# Patient Record
Sex: FEMALE | Race: WHITE | Employment: FULL TIME | URBAN - METROPOLITAN AREA
[De-identification: names, ages, dates, MRNs, and addresses within clinical notes are randomized per-mention and may not be internally consistent; named-entity substitution may affect disease eponyms.]

---

## 2020-01-21 ENCOUNTER — HOSPITAL ENCOUNTER (OUTPATIENT)
Dept: SURGERY | Age: 45
Discharge: HOME | End: 2020-01-21

## 2020-01-21 VITALS — HEIGHT: 64 IN | WEIGHT: 130 LBS | BODY MASS INDEX: 22.2 KG/M2

## 2020-01-21 RX ORDER — ACETAMINOPHEN 325 MG/1
TABLET ORAL
COMMUNITY
End: 2020-01-27

## 2020-01-21 NOTE — PERIOP NOTES
Patient verified name and . Order for consent NOT found in EHR, unable to confirm case posting; patient verifies procedure. Type 2 surgery, PAT phone assessment complete. Orders NOT received. Labs per surgeon: No orders received at this time. Labs per anesthesia protocol: Hgb DOS; order signed and held in EHR. Patient lives out of town and is not able to come have lab work drawn prior to surgery. Patient answered medical/surgical history questions at their best of ability. All prior to admission medications documented in [facility]. Patient instructed to take the following medications the day of surgery according to anesthesia guidelines with a small sip of water: Tylenol PRN. Hold all vitamins, supplements, herbals 7 days prior to surgery and NSAIDS 5 days prior to surgery. Patient instructed on the following:  Arrive at [address], time of arrival to be called the day before by 1700  NPO after midnight including gum, mints, and ice chips  Responsible adult must drive patient to the hospital, stay during surgery, and patient will need supervision 24 hours after anesthesia  Use anti-bacterial soap in shower the night before surgery and on the morning of surgery  All piercings must be removed prior to arrival.    Leave all valuables (money and jewelry) at home but bring insurance card and ID on       DOS. Do not wear make-up, nail polish, lotions, cologne, perfumes, powders, or oil on skin. Patient teach back successful and patient demonstrates knowledge of instruction.

## 2020-01-23 ENCOUNTER — ANESTHESIA EVENT (OUTPATIENT)
Dept: SURGERY | Age: 45
End: 2020-01-23
Payer: COMMERCIAL

## 2020-01-26 NOTE — H&P
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 This is causing pelvic pain issues, menorrhagia, bladder issues. 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 Eye: Agudelo Needs denies ocular pain or other visual symptoms. Cardiovascular: Agudelo Needs denies palpitations, chest pain or other cardiovascular problem. Respiratory: Reginailazack denies cough, breathing difficulty or other respiratory condition. Gastrointestinal: Reginailazack denies nausea, vomiting, diarrhea, constipation or other gastrointestinal symptom. Genitourinary: Christine denies dysuria, incontinence, hematuria or other genitourinary symptom. Integumentary: Magnus Needs denies galactorrhea or other breast problem. Neurologic: Agudelo Needs denies sensory defects, problems with coordination or concentration, or other neurologic problem. Psychiatric: Magnus Needs denies anxiey, depression or any psychiatric problems. VITAL SIGNS   Height : 5 ft 4 in. Weight: 123 BMI: 21.146   BP: 116 / 71 Resp. : 16 Pulse: 85     PHYSICAL EXAM   General Appearance: Well developed, well nourished patient in no acute distress. Skin: Normal. No rash, lesions or hirsutism. Head: Normocephalic / atraumatic. EENT: Normal eyes, ears, nose and throat. Neck: No masses, lymphadenopathy or thyromegaly. Chest: Normal breath sounds bilaterally with no rales, rhonchi or wheezes. Cardiac: Regular rate and rhythm without murmurs, rubs or gallops. Abdomen: Soft and non-tender to palpation. No organomegaly or massess. Extremities: No cyanosis, clubbing or edema. Neurologic: Grossly intact. Female Pelvic: Normal.     DISCUSSION / INFORMED CONSENT  The patient alone was present for this discussion.    We discussed the details of the planned surgery including the risks, benefits and treatment alternatives. We discussed the incision sites and the anticipated recovery period as well as the risks, benefits, and alternatives to the procedure. The risks include damage to bowel, bladder, or other organs, risk of bleeding, infection, adhesion formation, risk of the procedure not being able to be fully completed and need for future surgery. There are also small risks of requiring a blood transfusion, risks associated with anesthesia, development of deep venous thrombosis, heart attack, and even death. We discussed the possibility that this surgery may not completely eliminate her problem and that future procedures may be needed. Specific to a laparoscopy, there are additional risks of damage to internal organs due to the placement of intrabdominal trocars and instruments. In addition, there may be a need to convert the laparoscopy to an open procedure using a larger abdominal incision due to complications or inability to complete the procedure laparoscopically. Specific to a hysteroscopy are risks of uterine perforation, uterine bleeding, infection, and the risks of fluid overload due to absorption of hysteroscopic fluids. In some cases laparoscopy will need to be performed, and in rare cases, mainly due bleeding from perforation, a laparotomy, or incision on the abdomen will be necessary. We discusssed the laparoscopic myomectomy procedure including the anticipated abdominal incisions and small chance of needing to convert to an open procedure. In most cases, the majority of fibroids can be removed, but there is always a chance that all fibroids cannot be located or some may not be able to be removed. Discussed the possibility or need to open the uterine cavity and risk of intrauterine scar tissue formation with this. Discussed the recurrence rate of fibroids and that they may need to be addressed again in the futire.  Discussed the risk of bleeding and the possible development of coagulation disorders and/or the need for a blood transfusion during or after the procedure. Also discussed the risk of intraabdominal adhesion formation which could affect the intestines, fallopian tubes and/or subsequent fertility. We discussed the anticipated hospital course and recovery period. I discussed specifically risks associated with fibroid morcellation. Discussed the concerns that a fibroid may harbour a malignancy, but this chance is very low, but does increase with age. In the low chance that a malignancy was found, this could cause it to be in a higher stage possibly affecting treatments and outcomes. An alternative to morcellation would be an open myomectomy or possible removal vaginally, but this is dependent on the clinical situation. However, there is no evidence that an open myomectomy or non-morcellation myomectomy would be associated with better outcomes in the low chance of a malignancy. There are clearly increased risks in morbidity with an open myomectomy vs a laparoscopic myomectomy. The patient voiced understanding of all of the risks and wished to proceed with the procedure. Informed consents were signed.      PLAN:   Robotic-assisted laparoscopic myomectomy, hysteroscopy

## 2020-01-27 ENCOUNTER — ANESTHESIA (OUTPATIENT)
Dept: SURGERY | Age: 45
End: 2020-01-27
Payer: COMMERCIAL

## 2020-01-27 ENCOUNTER — APPOINTMENT (OUTPATIENT)
Dept: GENERAL RADIOLOGY | Age: 45
End: 2020-01-27
Attending: ANESTHESIOLOGY
Payer: COMMERCIAL

## 2020-01-27 ENCOUNTER — HOSPITAL ENCOUNTER (OUTPATIENT)
Age: 45
Discharge: HOME | End: 2020-01-27
Attending: OBSTETRICS & GYNECOLOGY | Admitting: OBSTETRICS & GYNECOLOGY
Payer: COMMERCIAL

## 2020-01-27 VITALS
DIASTOLIC BLOOD PRESSURE: 52 MMHG | TEMPERATURE: 100 F | OXYGEN SATURATION: 92 % | WEIGHT: 131 LBS | RESPIRATION RATE: 18 BRPM | HEIGHT: 64 IN | HEART RATE: 63 BPM | BODY MASS INDEX: 22.36 KG/M2 | SYSTOLIC BLOOD PRESSURE: 96 MMHG

## 2020-01-27 DIAGNOSIS — R10.2 PELVIC PAIN: Primary | ICD-10-CM

## 2020-01-27 PROBLEM — D21.9 FIBROIDS: Status: ACTIVE | Noted: 2020-01-27

## 2020-01-27 LAB
ABO + RH BLD: NORMAL
ATRIAL RATE: 67 BPM
BLOOD GROUP ANTIBODIES SERPL: NORMAL
CALCULATED P AXIS, ECG09: 46 DEGREES
CALCULATED R AXIS, ECG10: 26 DEGREES
CALCULATED T AXIS, ECG11: 14 DEGREES
DIAGNOSIS, 93000: NORMAL
ERYTHROCYTE [DISTWIDTH] IN BLOOD BY AUTOMATED COUNT: 12.4 % (ref 11.9–14.6)
HCG UR QL: NEGATIVE
HCT VFR BLD AUTO: 38.3 % (ref 35.8–46.3)
HGB BLD-MCNC: 12.7 G/DL (ref 11.7–15.4)
MCH RBC QN AUTO: 31.6 PG (ref 26.1–32.9)
MCHC RBC AUTO-ENTMCNC: 33.2 G/DL (ref 31.4–35)
MCV RBC AUTO: 95.3 FL (ref 79.6–97.8)
NRBC # BLD: 0 K/UL (ref 0–0.2)
P-R INTERVAL, ECG05: 130 MS
PLATELET # BLD AUTO: 233 K/UL (ref 150–450)
PMV BLD AUTO: 9.8 FL (ref 9.4–12.3)
Q-T INTERVAL, ECG07: 434 MS
QRS DURATION, ECG06: 84 MS
QTC CALCULATION (BEZET), ECG08: 458 MS
RBC # BLD AUTO: 4.02 M/UL (ref 4.05–5.2)
SPECIMEN EXP DATE BLD: NORMAL
TROPONIN I SERPL-MCNC: <0.02 NG/ML (ref 0.02–0.05)
VENTRICULAR RATE, ECG03: 67 BPM
WBC # BLD AUTO: 4.3 K/UL (ref 4.3–11.1)

## 2020-01-27 PROCEDURE — 77030003578 HC NDL INSUF VERES AMR -B: Performed by: OBSTETRICS & GYNECOLOGY

## 2020-01-27 PROCEDURE — 81025 URINE PREGNANCY TEST: CPT

## 2020-01-27 PROCEDURE — 93005 ELECTROCARDIOGRAM TRACING: CPT | Performed by: ANESTHESIOLOGY

## 2020-01-27 PROCEDURE — 74011250636 HC RX REV CODE- 250/636: Performed by: ANESTHESIOLOGY

## 2020-01-27 PROCEDURE — 86900 BLOOD TYPING SEROLOGIC ABO: CPT

## 2020-01-27 PROCEDURE — 74011250636 HC RX REV CODE- 250/636: Performed by: OBSTETRICS & GYNECOLOGY

## 2020-01-27 PROCEDURE — 77030040922 HC BLNKT HYPOTHRM STRY -A: Performed by: NURSE ANESTHETIST, CERTIFIED REGISTERED

## 2020-01-27 PROCEDURE — 77030018846 HC SOL IRR STRL H20 ICUM -A: Performed by: OBSTETRICS & GYNECOLOGY

## 2020-01-27 PROCEDURE — 76210000021 HC REC RM PH II 0.5 TO 1 HR: Performed by: OBSTETRICS & GYNECOLOGY

## 2020-01-27 PROCEDURE — 74011250637 HC RX REV CODE- 250/637: Performed by: OBSTETRICS & GYNECOLOGY

## 2020-01-27 PROCEDURE — 85027 COMPLETE CBC AUTOMATED: CPT

## 2020-01-27 PROCEDURE — 74011250636 HC RX REV CODE- 250/636: Performed by: NURSE ANESTHETIST, CERTIFIED REGISTERED

## 2020-01-27 PROCEDURE — 77030016151 HC PROTCTR LNS DFOG COVD -B: Performed by: OBSTETRICS & GYNECOLOGY

## 2020-01-27 PROCEDURE — 77030039425 HC BLD LARYNG TRULITE DISP TELE -A: Performed by: NURSE ANESTHETIST, CERTIFIED REGISTERED

## 2020-01-27 PROCEDURE — 77030008756 HC TU IRR SUC STRY -B: Performed by: OBSTETRICS & GYNECOLOGY

## 2020-01-27 PROCEDURE — 77030010545: Performed by: OBSTETRICS & GYNECOLOGY

## 2020-01-27 PROCEDURE — 77030002933 HC SUT MCRYL J&J -A: Performed by: OBSTETRICS & GYNECOLOGY

## 2020-01-27 PROCEDURE — 77030031492 HC PRT ACC BLNT AIRSEAL CNMD -B: Performed by: OBSTETRICS & GYNECOLOGY

## 2020-01-27 PROCEDURE — 77030022704 HC SUT VLOC COVD -B: Performed by: OBSTETRICS & GYNECOLOGY

## 2020-01-27 PROCEDURE — 76060000035 HC ANESTHESIA 2 TO 2.5 HR: Performed by: OBSTETRICS & GYNECOLOGY

## 2020-01-27 PROCEDURE — 77030037088 HC TUBE ENDOTRACH ORAL NSL COVD-A: Performed by: NURSE ANESTHETIST, CERTIFIED REGISTERED

## 2020-01-27 PROCEDURE — 77030011502 HC MANIP UTER ZUM ZINN -B: Performed by: OBSTETRICS & GYNECOLOGY

## 2020-01-27 PROCEDURE — C1765 ADHESION BARRIER: HCPCS | Performed by: OBSTETRICS & GYNECOLOGY

## 2020-01-27 PROCEDURE — 77030020703 HC SEAL CANN DISP INTU -B: Performed by: OBSTETRICS & GYNECOLOGY

## 2020-01-27 PROCEDURE — 77030019927 HC TBNG IRR CYSTO BAXT -A: Performed by: OBSTETRICS & GYNECOLOGY

## 2020-01-27 PROCEDURE — 74011000250 HC RX REV CODE- 250: Performed by: NURSE ANESTHETIST, CERTIFIED REGISTERED

## 2020-01-27 PROCEDURE — 77030010507 HC ADH SKN DERMBND J&J -B: Performed by: OBSTETRICS & GYNECOLOGY

## 2020-01-27 PROCEDURE — C1782 MORCELLATOR: HCPCS | Performed by: OBSTETRICS & GYNECOLOGY

## 2020-01-27 PROCEDURE — 74011250637 HC RX REV CODE- 250/637: Performed by: ANESTHESIOLOGY

## 2020-01-27 PROCEDURE — 77030018836 HC SOL IRR NACL ICUM -A: Performed by: OBSTETRICS & GYNECOLOGY

## 2020-01-27 PROCEDURE — 76210000017 HC OR PH I REC 1.5 TO 2 HR: Performed by: OBSTETRICS & GYNECOLOGY

## 2020-01-27 PROCEDURE — 77030035277 HC OBTRTR BLDELSS DISP INTU -B: Performed by: OBSTETRICS & GYNECOLOGY

## 2020-01-27 PROCEDURE — 88305 TISSUE EXAM BY PATHOLOGIST: CPT

## 2020-01-27 PROCEDURE — 74011000250 HC RX REV CODE- 250: Performed by: OBSTETRICS & GYNECOLOGY

## 2020-01-27 PROCEDURE — 76010000875 HC OR TIME 1.5 TO 2HR INTENSV - TIER 2: Performed by: OBSTETRICS & GYNECOLOGY

## 2020-01-27 PROCEDURE — 77030040361 HC SLV COMPR DVT MDII -B: Performed by: OBSTETRICS & GYNECOLOGY

## 2020-01-27 PROCEDURE — 77030034696 HC CATH URETH FOL 2W BARD -A: Performed by: OBSTETRICS & GYNECOLOGY

## 2020-01-27 PROCEDURE — 71045 X-RAY EXAM CHEST 1 VIEW: CPT

## 2020-01-27 PROCEDURE — 77030031139 HC SUT VCRL2 J&J -A: Performed by: OBSTETRICS & GYNECOLOGY

## 2020-01-27 PROCEDURE — 84484 ASSAY OF TROPONIN QUANT: CPT

## 2020-01-27 RX ORDER — NEOSTIGMINE METHYLSULFATE 1 MG/ML
INJECTION, SOLUTION INTRAVENOUS AS NEEDED
Status: DISCONTINUED | OUTPATIENT
Start: 2020-01-27 | End: 2020-01-27 | Stop reason: HOSPADM

## 2020-01-27 RX ORDER — GLYCOPYRROLATE 0.2 MG/ML
INJECTION INTRAMUSCULAR; INTRAVENOUS AS NEEDED
Status: DISCONTINUED | OUTPATIENT
Start: 2020-01-27 | End: 2020-01-27 | Stop reason: HOSPADM

## 2020-01-27 RX ORDER — SODIUM CHLORIDE, SODIUM LACTATE, POTASSIUM CHLORIDE, CALCIUM CHLORIDE 600; 310; 30; 20 MG/100ML; MG/100ML; MG/100ML; MG/100ML
100 INJECTION, SOLUTION INTRAVENOUS CONTINUOUS
Status: DISCONTINUED | OUTPATIENT
Start: 2020-01-27 | End: 2020-01-27 | Stop reason: HOSPADM

## 2020-01-27 RX ORDER — SODIUM CHLORIDE, SODIUM LACTATE, POTASSIUM CHLORIDE, CALCIUM CHLORIDE 600; 310; 30; 20 MG/100ML; MG/100ML; MG/100ML; MG/100ML
75 INJECTION, SOLUTION INTRAVENOUS CONTINUOUS
Status: DISCONTINUED | OUTPATIENT
Start: 2020-01-27 | End: 2020-01-27 | Stop reason: HOSPADM

## 2020-01-27 RX ORDER — TRANEXAMIC ACID 100 MG/ML
INJECTION, SOLUTION INTRAVENOUS AS NEEDED
Status: DISCONTINUED | OUTPATIENT
Start: 2020-01-27 | End: 2020-01-27 | Stop reason: HOSPADM

## 2020-01-27 RX ORDER — LIDOCAINE HYDROCHLORIDE 10 MG/ML
0.1 INJECTION INFILTRATION; PERINEURAL AS NEEDED
Status: DISCONTINUED | OUTPATIENT
Start: 2020-01-27 | End: 2020-01-27 | Stop reason: HOSPADM

## 2020-01-27 RX ORDER — VASOPRESSIN 20 U/ML
INJECTION PARENTERAL AS NEEDED
Status: DISCONTINUED | OUTPATIENT
Start: 2020-01-27 | End: 2020-01-27 | Stop reason: HOSPADM

## 2020-01-27 RX ORDER — KETOROLAC TROMETHAMINE 30 MG/ML
INJECTION, SOLUTION INTRAMUSCULAR; INTRAVENOUS AS NEEDED
Status: DISCONTINUED | OUTPATIENT
Start: 2020-01-27 | End: 2020-01-27 | Stop reason: HOSPADM

## 2020-01-27 RX ORDER — SODIUM CHLORIDE 0.9 % (FLUSH) 0.9 %
5-40 SYRINGE (ML) INJECTION AS NEEDED
Status: DISCONTINUED | OUTPATIENT
Start: 2020-01-27 | End: 2020-01-27 | Stop reason: HOSPADM

## 2020-01-27 RX ORDER — BUPIVACAINE HYDROCHLORIDE 2.5 MG/ML
INJECTION, SOLUTION EPIDURAL; INFILTRATION; INTRACAUDAL AS NEEDED
Status: DISCONTINUED | OUTPATIENT
Start: 2020-01-27 | End: 2020-01-27 | Stop reason: HOSPADM

## 2020-01-27 RX ORDER — MIDAZOLAM HYDROCHLORIDE 1 MG/ML
2 INJECTION, SOLUTION INTRAMUSCULAR; INTRAVENOUS
Status: DISCONTINUED | OUTPATIENT
Start: 2020-01-27 | End: 2020-01-27 | Stop reason: HOSPADM

## 2020-01-27 RX ORDER — EPINEPHRINE 1 MG/ML
INJECTION INTRAMUSCULAR; INTRAVENOUS; SUBCUTANEOUS AS NEEDED
Status: DISCONTINUED | OUTPATIENT
Start: 2020-01-27 | End: 2020-01-27 | Stop reason: HOSPADM

## 2020-01-27 RX ORDER — NALOXONE HYDROCHLORIDE 0.4 MG/ML
0.1 INJECTION, SOLUTION INTRAMUSCULAR; INTRAVENOUS; SUBCUTANEOUS
Status: DISCONTINUED | OUTPATIENT
Start: 2020-01-27 | End: 2020-01-27 | Stop reason: HOSPADM

## 2020-01-27 RX ORDER — FLUMAZENIL 0.1 MG/ML
0.2 INJECTION INTRAVENOUS
Status: DISCONTINUED | OUTPATIENT
Start: 2020-01-27 | End: 2020-01-27 | Stop reason: HOSPADM

## 2020-01-27 RX ORDER — ROCURONIUM BROMIDE 10 MG/ML
INJECTION, SOLUTION INTRAVENOUS AS NEEDED
Status: DISCONTINUED | OUTPATIENT
Start: 2020-01-27 | End: 2020-01-27 | Stop reason: HOSPADM

## 2020-01-27 RX ORDER — METHYLENE BLUE 10 MG/ML
INJECTION INTRAVENOUS AS NEEDED
Status: DISCONTINUED | OUTPATIENT
Start: 2020-01-27 | End: 2020-01-27 | Stop reason: HOSPADM

## 2020-01-27 RX ORDER — CEFAZOLIN SODIUM/WATER 2 G/20 ML
2 SYRINGE (ML) INTRAVENOUS ONCE
Status: COMPLETED | OUTPATIENT
Start: 2020-01-27 | End: 2020-01-27

## 2020-01-27 RX ORDER — HYDROMORPHONE HYDROCHLORIDE 2 MG/ML
INJECTION, SOLUTION INTRAMUSCULAR; INTRAVENOUS; SUBCUTANEOUS AS NEEDED
Status: DISCONTINUED | OUTPATIENT
Start: 2020-01-27 | End: 2020-01-27 | Stop reason: HOSPADM

## 2020-01-27 RX ORDER — FENTANYL CITRATE 50 UG/ML
INJECTION, SOLUTION INTRAMUSCULAR; INTRAVENOUS AS NEEDED
Status: DISCONTINUED | OUTPATIENT
Start: 2020-01-27 | End: 2020-01-27 | Stop reason: HOSPADM

## 2020-01-27 RX ORDER — OXYCODONE HYDROCHLORIDE 5 MG/1
5 TABLET ORAL
Status: DISCONTINUED | OUTPATIENT
Start: 2020-01-27 | End: 2020-01-27 | Stop reason: HOSPADM

## 2020-01-27 RX ORDER — OXYCODONE AND ACETAMINOPHEN 5; 325 MG/1; MG/1
1 TABLET ORAL
Qty: 30 TAB | Refills: 0 | Status: SHIPPED | OUTPATIENT
Start: 2020-01-27 | End: 2020-01-30

## 2020-01-27 RX ORDER — MISOPROSTOL 200 UG/1
400 TABLET ORAL ONCE
Status: COMPLETED | OUTPATIENT
Start: 2020-01-27 | End: 2020-01-27

## 2020-01-27 RX ORDER — DIPHENHYDRAMINE HYDROCHLORIDE 50 MG/ML
12.5 INJECTION, SOLUTION INTRAMUSCULAR; INTRAVENOUS
Status: DISCONTINUED | OUTPATIENT
Start: 2020-01-27 | End: 2020-01-27 | Stop reason: HOSPADM

## 2020-01-27 RX ORDER — ONDANSETRON 2 MG/ML
INJECTION INTRAMUSCULAR; INTRAVENOUS AS NEEDED
Status: DISCONTINUED | OUTPATIENT
Start: 2020-01-27 | End: 2020-01-27 | Stop reason: HOSPADM

## 2020-01-27 RX ORDER — DEXAMETHASONE SODIUM PHOSPHATE 4 MG/ML
INJECTION, SOLUTION INTRA-ARTICULAR; INTRALESIONAL; INTRAMUSCULAR; INTRAVENOUS; SOFT TISSUE AS NEEDED
Status: DISCONTINUED | OUTPATIENT
Start: 2020-01-27 | End: 2020-01-27 | Stop reason: HOSPADM

## 2020-01-27 RX ORDER — SODIUM CHLORIDE 0.9 % (FLUSH) 0.9 %
5-40 SYRINGE (ML) INJECTION EVERY 8 HOURS
Status: DISCONTINUED | OUTPATIENT
Start: 2020-01-27 | End: 2020-01-27 | Stop reason: HOSPADM

## 2020-01-27 RX ORDER — IBUPROFEN 800 MG/1
800 TABLET ORAL
Qty: 60 TAB | Refills: 0 | Status: SHIPPED | OUTPATIENT
Start: 2020-01-27 | End: 2020-02-06

## 2020-01-27 RX ORDER — LIDOCAINE HYDROCHLORIDE 20 MG/ML
INJECTION, SOLUTION EPIDURAL; INFILTRATION; INTRACAUDAL; PERINEURAL AS NEEDED
Status: DISCONTINUED | OUTPATIENT
Start: 2020-01-27 | End: 2020-01-27 | Stop reason: HOSPADM

## 2020-01-27 RX ORDER — HYDROMORPHONE HYDROCHLORIDE 2 MG/ML
0.5 INJECTION, SOLUTION INTRAMUSCULAR; INTRAVENOUS; SUBCUTANEOUS
Status: DISCONTINUED | OUTPATIENT
Start: 2020-01-27 | End: 2020-01-27 | Stop reason: HOSPADM

## 2020-01-27 RX ORDER — ACETAMINOPHEN 500 MG
1000 TABLET ORAL ONCE
Status: COMPLETED | OUTPATIENT
Start: 2020-01-27 | End: 2020-01-27

## 2020-01-27 RX ORDER — GABAPENTIN 300 MG/1
600 CAPSULE ORAL ONCE
Status: COMPLETED | OUTPATIENT
Start: 2020-01-27 | End: 2020-01-27

## 2020-01-27 RX ORDER — PROPOFOL 10 MG/ML
INJECTION, EMULSION INTRAVENOUS AS NEEDED
Status: DISCONTINUED | OUTPATIENT
Start: 2020-01-27 | End: 2020-01-27 | Stop reason: HOSPADM

## 2020-01-27 RX ADMIN — HYDROMORPHONE HYDROCHLORIDE 0.5 MG: 2 INJECTION INTRAMUSCULAR; INTRAVENOUS; SUBCUTANEOUS at 13:24

## 2020-01-27 RX ADMIN — Medication 2 MG: at 12:40

## 2020-01-27 RX ADMIN — MISOPROSTOL 400 MCG: 200 TABLET ORAL at 10:41

## 2020-01-27 RX ADMIN — PROPOFOL 200 MG: 10 INJECTION, EMULSION INTRAVENOUS at 11:09

## 2020-01-27 RX ADMIN — KETOROLAC TROMETHAMINE 30 MG: 30 INJECTION, SOLUTION INTRAMUSCULAR; INTRAVENOUS at 11:18

## 2020-01-27 RX ADMIN — GLYCOPYRROLATE 0.2 MG: 0.2 INJECTION, SOLUTION INTRAMUSCULAR; INTRAVENOUS at 11:27

## 2020-01-27 RX ADMIN — ROCURONIUM BROMIDE 40 MG: 10 INJECTION, SOLUTION INTRAVENOUS at 11:09

## 2020-01-27 RX ADMIN — ONDANSETRON 4 MG: 2 INJECTION INTRAMUSCULAR; INTRAVENOUS at 11:18

## 2020-01-27 RX ADMIN — FENTANYL CITRATE 100 MCG: 50 INJECTION INTRAMUSCULAR; INTRAVENOUS at 11:05

## 2020-01-27 RX ADMIN — LIDOCAINE HYDROCHLORIDE 60 MG: 20 INJECTION, SOLUTION EPIDURAL; INFILTRATION; INTRACAUDAL; PERINEURAL at 11:09

## 2020-01-27 RX ADMIN — SODIUM CHLORIDE, SODIUM LACTATE, POTASSIUM CHLORIDE, AND CALCIUM CHLORIDE 100 ML/HR: 600; 310; 30; 20 INJECTION, SOLUTION INTRAVENOUS at 10:41

## 2020-01-27 RX ADMIN — Medication 2 G: at 11:15

## 2020-01-27 RX ADMIN — GABAPENTIN 600 MG: 300 CAPSULE ORAL at 10:40

## 2020-01-27 RX ADMIN — SODIUM CHLORIDE, SODIUM LACTATE, POTASSIUM CHLORIDE, AND CALCIUM CHLORIDE: 600; 310; 30; 20 INJECTION, SOLUTION INTRAVENOUS at 12:33

## 2020-01-27 RX ADMIN — TRANEXAMIC ACID 1000 MG: 100 INJECTION, SOLUTION INTRAVENOUS at 11:36

## 2020-01-27 RX ADMIN — DEXAMETHASONE SODIUM PHOSPHATE 10 MG: 4 INJECTION, SOLUTION INTRAMUSCULAR; INTRAVENOUS at 11:18

## 2020-01-27 RX ADMIN — ACETAMINOPHEN 1000 MG: 500 TABLET, FILM COATED ORAL at 10:41

## 2020-01-27 RX ADMIN — GLYCOPYRROLATE 0.2 MG: 0.2 INJECTION, SOLUTION INTRAMUSCULAR; INTRAVENOUS at 12:40

## 2020-01-27 RX ADMIN — HYDROMORPHONE HYDROCHLORIDE 0.4 MG: 2 INJECTION INTRAMUSCULAR; INTRAVENOUS; SUBCUTANEOUS at 12:44

## 2020-01-27 NOTE — DISCHARGE INSTRUCTIONS
INSTRUCTIONS FOLLOWING HYSTEROSCOPY    ACTIVITY   Limited activity today; increase as tolerated tomorrow, but no vigorous exercise   Shower only; no tub baths   No douches, tampons or intercourse until your doctor releases you    May return to work or school as directed by your doctor    DIET   Clear liquids until no nausea or vomiting   Advance to regular diet as tolerated    PAIN   Expect a moderate amount of pain.  Take pain medication as directed by your doctor. If no prescription for pain is sent         home with you, take the appropriate dose of your commonly used pain medication.  Call your doctor if pain is NOT relieved by medication.  DO NOT take aspirin or blood thinners until directed by your doctor. FOLLOW-UP PHONE [address] will be made by nursing staff   If you have any problems, call your doctor as needed. CALL YOUR DOCTOR IF   Excessive bleeding that soaks a pad in an hour   Temperature of 101°F or above   Green or yellow, smelly discharge   Unable to urinate by bedtime   Nausea and vomiting that does not stop by bedtime    AFTER ANESTHESIA   For the first 24 hours: DO NOT Drive, Drink alcoholic beverages, or Make important decisions.  Beware of dizziness following anesthesia and while taking pain medication.    Plan to stay tonight within 1 hours drive of the hospital

## 2020-01-27 NOTE — PERIOP NOTES
Patient c/o chest pain and \"feels like an elephant is sitting on chest\" Dr. Rachael Lundborg notified.  At bedside requesting 12 lead EKG, Portable CXR and Troponin

## 2020-01-27 NOTE — BRIEF OP NOTE
BRIEF OPERATIVE NOTE    Date of Procedure: 1/27/2020   Preoperative Diagnosis: Uterine leiomyoma, unspecified location [D25.9]  Pelvic pain in female [R10.2]  Postoperative Diagnosis: Uterine leiomyoma, unspecified location [D25.9]  Pelvic pain in female [R10.2]    Procedure(s): MYOMECTOMY ROBOTIC ASSISTED LAPAROSCOPIC, HYSTEROSCOPY, excision of endometriosis  Surgeon(s) and Role:     * Socrates Peoples MD - Primary         Surgical Assistant: Debra Herzog [address] [place] [address]    Surgical Staff:  Circ-1: Nimco Mccloud RN  Circ-2: Lauro Hendrickson RN  Scrub Tech-1: Tyson Bermudez  Scrub Tech-2: José Miguel Shabazz  Event Time In Time Out   Incision Start 1125    Incision Close 1250      Anesthesia: General   Estimated Blood Loss: 25cc  Specimens:   ID Type Source Tests Collected by Time Destination   1 : right pelvic side wall Preservative Pelvis  Marin Smith MD 1/27/2020 1220 Pathology   2 : left pelvic side wall Preservative Pelvis  Marin Smith MD 1/27/2020 1221 Pathology   3 : posterior cul de sac Preservative Peritoneum  Marin Smith MD 1/27/2020 1223 Pathology   4 : fibroids Fresh Uterine  Debra Sauce Socrates Huber MD 1/27/2020 1234 Pathology      Findings: uterine cavity distorted by 8cm right intramural fibroid. Normal ovaries and tubes bilaterally, small rudimentary left uterine horn attached to left tube. In posterior pelvis, left sidwall peritoneal window, with mild changes on right as well of endo. In posterior CDS, there is an area of 3cm of affected peritoneum with papillary serous features,  All fully excisied.    No other similar lesions in the abdomen or pelvis  Complications: none  Implants: * No implants in log *

## 2020-01-27 NOTE — ANESTHESIA PREPROCEDURE EVALUATION
Relevant Problems   No relevant active problems       Anesthetic History   No history of anesthetic complications            Review of Systems / Medical History  Patient summary reviewed and pertinent labs reviewed    Pulmonary  Within defined limits                 Neuro/Psych   Within defined limits           Cardiovascular  Within defined limits                Exercise tolerance: >4 METS     GI/Hepatic/Renal  Within defined limits              Endo/Other  Within defined limits           Other Findings              Physical Exam    Airway  Mallampati: I  TM Distance: > 6 cm  Neck ROM: normal range of motion   Mouth opening: Normal     Cardiovascular    Rhythm: regular  Rate: normal         Dental  No notable dental hx       Pulmonary  Breath sounds clear to auscultation               Abdominal  GI exam deferred       Other Findings            Anesthetic Plan    ASA: 1  Anesthesia type: general  ETT        Induction: Intravenous  Anesthetic plan and risks discussed with: Patient

## 2020-01-27 NOTE — PERIOP NOTES
Patient complaining of chest pain/shortness of breath. States it feels as if something is sitting on her chest, making it hard for her to breathe. Vital signs stable and within parameters. Breath sounds clear. Dr. Akila Locke notified and to bedside. Stat chest xray, troponin, and 12 lead ordered and completed at bedside. Patient placed on NC 2 liters for comfort measures. 1420: Pt removed nasal cannula, sats 94-97% on room air. No respiratory distress. Breath sounds clear. All VSS. Patient given IS to help open up airways. Monitoring.

## 2020-01-27 NOTE — ANESTHESIA POSTPROCEDURE EVALUATION
Procedure(s): MYOMECTOMY ROBOTIC ASSISTED LAPAROSCOPIC  HYSTEROSCOPY. general    Anesthesia Post Evaluation      Multimodal analgesia: multimodal analgesia used between 6 hours prior to anesthesia start to PACU discharge  Patient location during evaluation: PACU  Patient participation: complete - patient participated  Level of consciousness: awake and alert  Pain management: adequate  Airway patency: patent  Anesthetic complications: no  Cardiovascular status: acceptable  Respiratory status: acceptable  Hydration status: acceptable  Comments: When patient was close to discharge she began to complain of some chest discomfort as well as trouble catching her breath. She reported that it felt like she couldn't catch her breath. Patient has no known medical problems and had an uncomplicated procedure. EKG was obtained and was normal and chest xray was unremarkable. Upon recheck, patient was resting comfortably without an oxygen requirement and a HR < 70. Patient is to be discharged home today. Given low likelihood of any serious CV condition, patient was educated to report to the ER if she has any ongoing/worsening chest pain/SOB. She endorsed understanding and all questions addressed.    Post anesthesia nausea and vomiting:  controlled      Vitals Value Taken Time   BP 94/46 1/27/2020  1:48 PM   Temp 37.4 °C (99.3 °F) 1/27/2020  1:33 PM   Pulse 73 1/27/2020  1:48 PM   Resp 18 1/27/2020  1:48 PM   SpO2 95 % 1/27/2020  1:48 PM

## 2020-01-28 NOTE — OP NOTES
[address] [address]  OPERATIVE REPORT    Name:  Sandip Head  MR#:  [id number]  :  1975  ACCOUNT #:  [de-identified]  DATE OF SERVICE:  2020    PREOPERATIVE DIAGNOSES:  1. Uterine fibroids. 2.  Pelvic pain. POSTOPERATIVE DIAGNOSES:  1. Uterine fibroids. 2.  Pelvic pain. PROCEDURES PERFORMED:  1.  Robotic-assisted laparoscopic myomectomy of one large uterine fibroid. 2.  Diagnostic hysteroscopy. 3.  Excision of endometriosis. SURGEON:  Crystal Medellin MD    ASSISTANT:  Raina Candelario, Certified Surgical First Assist.    ANESTHESIA:  General endotracheal.    COMPLICATIONS:  None. SPECIMENS REMOVED:  1. Right pelvic sidewall. 2.  Left pelvic sidewall. 3.  Posterior cul-de-sac peritoneum. 4.  Uterine fibroid x1. IMPLANTS:  None. ESTIMATED BLOOD LOSS:  25 mL. DRAINS:  None. FINDINGS:  At hysteroscopy, the uterine cavity was significantly distorted and impinged on the right side by the 8 cm right intramural fibroid. At laparoscopy, the patient has known right unicornuate uterus with this large right-sided intramural fibroid and a small left rudimentary uterine horn that was attached to the left tube. Both tubes and ovaries appeared normal.  The appendix and upper abdomen were normal.  In the posterior pelvis on the left sidewall, there was a peritoneal window consistent with likely underlying endometriosis and mild changes on the right side wall peritoneum of likely endometriosis. This was all fully excised. Within the posterior cul-de-sac on the posterior right side, there was an area of affected peritoneum approximately 3 cm in diameter that had papillary serous features and was firm. This area was all fully excised and there was a small amount of these features within the left pelvic sidewall peritoneal window. There were no other similar-type lesions found within the abdomen or pelvis.     PROCEDURE:  The patient was taken to the operating room, where general endotracheal anesthesia was administered. She was prepped and draped in normal sterile fashion. Butler catheter was inserted. Diagnostic hysteroscopy was performed revealing the above-stated findings. Attention was then turned to the abdomen, where the umbilical area and all port sites were infiltrated with 0.25% Marcaine solution. An 8 mm incision was made in the umbilicus and a Veress needle was placed in the abdominal cavity insufflating the abdomen to 15 mmHg after which an 8 mm bladeless trocar was placed under direct visualization without difficulty. Three 8 mm robotic ports and 8 mm assistant port were placed under direct visualization as well and the intra-abdominal pressure dropped to 12 mmHg. The patient was placed in Trendelenburg position and the robotic system was docked. Inspection of the pelvis revealed the above-stated findings. A dilute vasopressin solution was injected into the myometrium overlying the large fibroid on the anterior side and an anterior longitudinal incision was made. This was taken down to level of the fibroid, where it was grasped and carefully and fully enucleated. The fibroid came out very cleanly with minimal blood loss and did encroach all the way into the endometrium, but the endometrial cavity was not opened. The myometrium was then carefully closed with multiple layers of 2-0 V-Loc suture using a total of four layers to carefully encircle the myometrial tissue back around the uterine cavity, closed all the dead space and reapproximated all the myometrium and ultimately closed the serosa as well. There was minimal blood loss during the myomectomy portion. The uterus was then elevated and the pelvis was inspected revealing the above-stated findings. The pelvic peritoneal window on the left side was between the ureter and the uterosacral ligament and this peritoneum was carefully excised and it did have small papillary lesions in part of this.   This was sent as a separate specimen. On the right pelvic sidewall where there were milder changes consistent with superficial minimal endometriosis and this was excised and sent separately. Within the posterior pelvis on the right posterior cul-de-sac, there was a larger area approximately 3 cm in diameter that was very firm and had extensive papillary serous-appearing lesions. This peritoneum was opened and completely excised and it did not invade any other structures. The ovaries were then carefully inspected. There were no similar lesions on the ovaries or tubes bilaterally. The omentum was also clear as was the remainder of the pelvis, the uterus, and the upper abdomen. The pelvis was then copiously irrigated and all areas were fully hemostatic. The robotic system was undocked. The umbilical incision was extended slightly to allow placement of LILI morcellator and this was used to morcellate the uterine fibroid taking great care to ensure that all portions of the fibroids were carefully removed from the abdomen, and the abdomen and pelvis were copiously irrigated again. It was still hemostatic and two sheets of Interceed were placed in the pelvis, one anteriorly over the hysterotomy incision and the other in the posterior aspect of the pelvis between the uterus and the rectum. The umbilical fascial incision was closed with 0 Vicryl suture and the abdomen was deflated. Skin incisions were closed with 4-0 Monocryl and sealed with surgical glue and Steri-Strips. All sponge, lap, and needle counts were correct. There were no other complications to the procedure.       Fuad Holbrook MD      [id number]  D:  01/27/2020 13:10  T:  01/27/2020 23:35  JOB #:  [id number]

## (undated) DEVICE — TIP COVER ACCESSORY

## (undated) DEVICE — APPLICATOR COT-TIP 6IN WOOD -- 2/PK STRL

## (undated) DEVICE — TRI-LUMEN FILTERED TUBE SET WITH ACTIVATED CHARCOAL FILTER: Brand: AIRSEAL

## (undated) DEVICE — ARM DRAPE

## (undated) DEVICE — SEAL UNIV 5-8MM DISP BX/10 -- DA VINCI XI - SNGL USE

## (undated) DEVICE — CARDINAL HEALTH FLEXIBLE LIGHT HANDLE COVER: Brand: CARDINAL HEALTH

## (undated) DEVICE — SPONGE LAP 18X18IN STRL -- 5/PK

## (undated) DEVICE — SYRINGE NDL 25GA 1ML L5/8IN BLU PLAS NDL S STL SHLD HYPO

## (undated) DEVICE — CONTAINER SPEC HISTOLOGY 900ML POLYPR

## (undated) DEVICE — CATHETER URETH 16FR BLLN 5CC SIL ALLY W/ SIL HYDRGEL 2 W F

## (undated) DEVICE — FILTER SMK EVAC FLO CLR MEGADYNE

## (undated) DEVICE — JELLY LUBRICATING 10GM PREFIL SYR LUBE

## (undated) DEVICE — SYR 10ML LUER LOK 1/5ML GRAD --

## (undated) DEVICE — CYSTO/BLADDER IRRIGATION SET, REGULATING CLAMP

## (undated) DEVICE — SUTURE MCRYL SZ 4-0 L27IN ABSRB UD L19MM PS-2 1/2 CIR PRIM Y426H

## (undated) DEVICE — SOLUTION IRRIG 1000ML H2O STRL BLT

## (undated) DEVICE — SUTURE V-LOC 180 SZ 2-0 L12IN ABSRB VLT GS-21 L37MM 1/2 CIR VLOCM0315

## (undated) DEVICE — FENESTRATED BIPOLAR FORCEPS: Brand: ENDOWRIST

## (undated) DEVICE — GARMENT,MEDLINE,DVT,INT,CALF,MED, GEN2: Brand: MEDLINE

## (undated) DEVICE — SOLUTION IRRIG 3000ML 0.9% SOD CHL FLX CONT 0797208] ICU MEDICAL INC]

## (undated) DEVICE — INSUFFLATION NEEDLE TO ESTABLISH PNEUMOPERITONEUM.: Brand: INSUFFLATION NEEDLE

## (undated) DEVICE — GOWN,REINF,POLY,ECL,PP SLV,XL: Brand: MEDLINE

## (undated) DEVICE — 2000CC GUARDIAN II: Brand: GUARDIAN

## (undated) DEVICE — BASIC SINGLE BASIN-LF: Brand: MEDLINE INDUSTRIES, INC.

## (undated) DEVICE — Device

## (undated) DEVICE — DRAPE TWL SURG 16X26IN BLU ORB04] ALLCARE INC]

## (undated) DEVICE — CONTROL SYRINGE LUER-LOCK TIP: Brand: MONOJECT

## (undated) DEVICE — Z CONVERTED USE 2421973 CONTAINER SPEC 60/30ML 10% FRMLN POLYPR PREFIL

## (undated) DEVICE — SUTURE SZ 0 27IN 5/8 CIR UR-6  TAPER PT VIOLET ABSRB VICRYL J603H

## (undated) DEVICE — ELECTRO LUBE IS A SINGLE PATIENT USE DEVICE THAT IS INTENDED TO BE USED ON ELECTROSURGICAL ELECTRODES TO REDUCE STICKING.: Brand: KEY SURGICAL ELECTRO LUBE

## (undated) DEVICE — DRAPE,TOP,102X53,STERILE: Brand: MEDLINE

## (undated) DEVICE — NEEDLE HYPO 21GA L1.5IN INTRAMUSCULAR S STL LATCH BVL UP

## (undated) DEVICE — DRAPE,UNDERBUTTOCKS,PCH,STERILE: Brand: MEDLINE

## (undated) DEVICE — ROBOTIC HYSTERECTOMY: Brand: MEDLINE INDUSTRIES, INC.

## (undated) DEVICE — 3M™ TEGADERM™ TRANSPARENT FILM DRESSING FRAME STYLE, 1627, 4 IN X 10 IN (10 CM X 25 CM), 20/CT 4CT/CASE: Brand: 3M™ TEGADERM™

## (undated) DEVICE — Z DUPLICATE USE 2847003 INJECTOR UTER

## (undated) DEVICE — 2, DISPOSABLE SUCTION/IRRIGATOR WITHOUT DISPOSABLE TIP: Brand: STRYKEFLOW

## (undated) DEVICE — STRIP,CLOSURE,WOUND,MEDI-STRIP,1/2X4: Brand: MEDLINE

## (undated) DEVICE — TRAY PREP DRY W/ PREM GLV 2 APPL 6 SPNG 2 UNDPD 1 OVERWRAP

## (undated) DEVICE — AIRSEAL 8 MM ACCESS PORT AND LOW PROFILE OBTURATOR WITH BLADELESS OPTICAL TIP, 120 MM LENGTH: Brand: AIRSEAL

## (undated) DEVICE — DERMABOND SKIN ADH 0.7ML -- DERMABOND ADVANCED 12/BX

## (undated) DEVICE — SCISSORS SURG DIA8MM MPLR CRV ENDOWRIST

## (undated) DEVICE — BAG DRNGE 4000ML CONT IRRIG ROUNDED TEARDROP SHP DISP

## (undated) DEVICE — VISUALIZATION SYSTEM: Brand: CLEARIFY

## (undated) DEVICE — REM POLYHESIVE ADULT PATIENT RETURN ELECTRODE: Brand: VALLEYLAB

## (undated) DEVICE — SYR LR LCK 1ML GRAD NSAF 30ML --

## (undated) DEVICE — PAD MATERNITY 11IN W/TAILS -- STRL

## (undated) DEVICE — 40585 XL ADVANCED TRENDELENBURG POSITIONING KIT: Brand: 40585 XL ADVANCED TRENDELENBURG POSITIONING KIT

## (undated) DEVICE — MEGA SUTURECUT ND: Brand: ENDOWRIST

## (undated) DEVICE — SYR 50ML LR LCK 1ML GRAD NSAF --

## (undated) DEVICE — BLADELESS OBTURATOR: Brand: WECK VISTA

## (undated) DEVICE — BARRIER TISS ADH ABSRB 3X4IN -- GYNECARE INTERCEED

## (undated) DEVICE — SET EXTN PRIMING VOL 2.4ML L20IN STD BOR SLDE CLMP SPIN M

## (undated) DEVICE — COLUMN DRAPE

## (undated) DEVICE — TENACULUM FORCEPS: Brand: ENDOWRIST

## (undated) DEVICE — LEGGINGS, PAIR, 31X48, STERILE: Brand: MEDLINE

## (undated) DEVICE — MORCELLATOR ENDO 15MM OBT DISPOSABLEXCISE

## (undated) DEVICE — (D)PREP SKN CHLRAPRP APPL 26ML -- CONVERT TO ITEM 371833